# Patient Record
Sex: FEMALE | Race: WHITE | NOT HISPANIC OR LATINO
[De-identification: names, ages, dates, MRNs, and addresses within clinical notes are randomized per-mention and may not be internally consistent; named-entity substitution may affect disease eponyms.]

---

## 2017-05-22 ENCOUNTER — RX RENEWAL (OUTPATIENT)
Age: 27
End: 2017-05-22

## 2017-08-14 ENCOUNTER — RX RENEWAL (OUTPATIENT)
Age: 27
End: 2017-08-14

## 2017-08-23 ENCOUNTER — APPOINTMENT (OUTPATIENT)
Dept: OBGYN | Facility: CLINIC | Age: 27
End: 2017-08-23
Payer: COMMERCIAL

## 2017-08-23 VITALS
DIASTOLIC BLOOD PRESSURE: 60 MMHG | SYSTOLIC BLOOD PRESSURE: 100 MMHG | WEIGHT: 114 LBS | BODY MASS INDEX: 20.2 KG/M2 | HEIGHT: 63 IN

## 2017-08-23 PROCEDURE — 99395 PREV VISIT EST AGE 18-39: CPT

## 2017-08-28 LAB — CYTOLOGY CVX/VAG DOC THIN PREP: NORMAL

## 2018-03-20 ENCOUNTER — MEDICATION RENEWAL (OUTPATIENT)
Age: 28
End: 2018-03-20

## 2018-06-04 ENCOUNTER — APPOINTMENT (OUTPATIENT)
Dept: OBGYN | Facility: CLINIC | Age: 28
End: 2018-06-04
Payer: COMMERCIAL

## 2018-06-04 VITALS
BODY MASS INDEX: 20.55 KG/M2 | WEIGHT: 116 LBS | SYSTOLIC BLOOD PRESSURE: 120 MMHG | DIASTOLIC BLOOD PRESSURE: 80 MMHG | HEIGHT: 63 IN

## 2018-06-04 PROCEDURE — 99395 PREV VISIT EST AGE 18-39: CPT

## 2018-06-08 LAB — CYTOLOGY CVX/VAG DOC THIN PREP: NORMAL

## 2018-06-13 ENCOUNTER — EMERGENCY (EMERGENCY)
Facility: HOSPITAL | Age: 28
LOS: 1 days | Discharge: HOME | End: 2018-06-13
Attending: EMERGENCY MEDICINE
Payer: COMMERCIAL

## 2018-06-13 VITALS
SYSTOLIC BLOOD PRESSURE: 106 MMHG | OXYGEN SATURATION: 100 % | TEMPERATURE: 99 F | DIASTOLIC BLOOD PRESSURE: 68 MMHG | WEIGHT: 110.01 LBS | RESPIRATION RATE: 16 BRPM | HEART RATE: 60 BPM

## 2018-06-13 PROCEDURE — 99283 EMERGENCY DEPT VISIT LOW MDM: CPT

## 2018-06-13 PROCEDURE — 73630 X-RAY EXAM OF FOOT: CPT | Mod: 26,LT

## 2018-06-13 PROCEDURE — 73630 X-RAY EXAM OF FOOT: CPT

## 2018-06-13 PROCEDURE — 99283 EMERGENCY DEPT VISIT LOW MDM: CPT | Mod: 25

## 2018-06-13 RX ORDER — IBUPROFEN 200 MG
600 TABLET ORAL ONCE
Qty: 0 | Refills: 0 | Status: COMPLETED | OUTPATIENT
Start: 2018-06-13 | End: 2018-06-13

## 2018-06-13 RX ADMIN — Medication 600 MILLIGRAM(S): at 17:20

## 2018-06-13 RX ADMIN — Medication 600 MILLIGRAM(S): at 16:50

## 2018-06-13 NOTE — ED PROVIDER NOTE - NS ED ROS FT
GEN: no fever, no chills, no weakness  HENT: no eye pain, no visual changes, no ear pain, no visual or hearing changes, no sore throat, no swelling or neck pain  CV: no chest pain, no palpitations, no dizziness, no swelling  RESP: no coughing, no sob, no IWOB, no MOCTEZUMA  GI: no abd pain, no distension, no nausea, no vomiting, no diarrhea, no constipation  : no dysuria,  no frequency, no hematuria, no discharge, no flank pain  MUSCULOSKELETAL: no myalgia, no arthralgia, no joint swelling, no bruising   SKIN: no rash, no wounds, no itching  NEURO: no change in mentation, no visual changes, no HA, no focal weakness, no trouble speaking, no gait abnormalities, no dizziness  PSYCH: no suidical ideation, no homicidal ideation, no depression, no anxiety, no hallucinations

## 2018-06-13 NOTE — ED PROVIDER NOTE - CARE PLAN
Principal Discharge DX:	Closed nondisplaced fracture of fifth metatarsal bone of left foot, initial encounter

## 2018-06-13 NOTE — ED ADULT NURSE NOTE - OBJECTIVE STATEMENT
Pt presents to the fast track area s/p left foot injury, + tenderness, + sensation, + pedal pulses, + mobility of the toes, + capillary refill < 2 sec.

## 2018-06-13 NOTE — ED PROVIDER NOTE - MEDICAL DECISION MAKING DETAILS
27yo F no pmh p/w L ankle/foot injury. reports took step wrong and inverted on her foot. 29yo F no pmh p/w L ankle/foot injury. reports took step wrong and inverted on her foot.  XR shows spiral fx of L 5th metatarsal splinted and given crutches, pt to f/u w/ pcp/ortho for further eval and mgmt

## 2018-06-13 NOTE — ED PROVIDER NOTE - PHYSICAL EXAMINATION
GEN: awake, alert, well appearing, NAD   HENT: atraumatic, normocephalic, ALFREDA, EOMI, no midline instability, oropharynx w/o erythema or exudates, no lymphadenopathy  CV: normal rate and rhythm, S1, S2, no MRG, equal pulses throughout, no JVD  RESP: no distress, no IWOB, no retraction, clear to auscultation bilaterally   ABD: soft, nontender, nondistended, no rebound, no guarding, normoactive bowel sounds, no organomegally  MUSCULOSKELETAL: strenght 5/5 x 4, full range of motion, CMS intact, swelling and ttp to L lateral forefoot, cms intact    SKIN: normal color, no turgor, no wounds or rash   NEURO: Awake alert oriented x 3, no facial asymmetry, no slurred speech, no pronator drift, moving all extremities  PSYCH: no suicial ideation, no homicidal ideation, no depression, no anxiety, no hallucination

## 2019-06-12 ENCOUNTER — APPOINTMENT (OUTPATIENT)
Dept: OBGYN | Facility: CLINIC | Age: 29
End: 2019-06-12
Payer: COMMERCIAL

## 2019-06-12 VITALS
DIASTOLIC BLOOD PRESSURE: 62 MMHG | SYSTOLIC BLOOD PRESSURE: 94 MMHG | HEIGHT: 63 IN | WEIGHT: 115 LBS | BODY MASS INDEX: 20.38 KG/M2

## 2019-06-12 PROCEDURE — 99395 PREV VISIT EST AGE 18-39: CPT

## 2019-06-12 PROCEDURE — 36415 COLL VENOUS BLD VENIPUNCTURE: CPT

## 2019-06-13 PROBLEM — J45.909 UNSPECIFIED ASTHMA, UNCOMPLICATED: Chronic | Status: ACTIVE | Noted: 2018-06-13

## 2019-06-17 LAB — CYTOLOGY CVX/VAG DOC THIN PREP: NORMAL

## 2019-06-24 ENCOUNTER — RX RENEWAL (OUTPATIENT)
Age: 29
End: 2019-06-24

## 2019-07-15 ENCOUNTER — APPOINTMENT (OUTPATIENT)
Dept: OBGYN | Facility: CLINIC | Age: 29
End: 2019-07-15

## 2020-06-22 ENCOUNTER — APPOINTMENT (OUTPATIENT)
Dept: OBGYN | Facility: CLINIC | Age: 30
End: 2020-06-22
Payer: COMMERCIAL

## 2020-06-22 VITALS
HEIGHT: 63 IN | BODY MASS INDEX: 20.73 KG/M2 | WEIGHT: 117 LBS | SYSTOLIC BLOOD PRESSURE: 104 MMHG | DIASTOLIC BLOOD PRESSURE: 60 MMHG

## 2020-06-22 PROCEDURE — 99395 PREV VISIT EST AGE 18-39: CPT

## 2020-06-23 RX ORDER — LACTASE 3000 UNIT
TABLET ORAL
Refills: 0 | Status: DISCONTINUED | COMMUNITY
End: 2020-06-23

## 2020-06-23 RX ORDER — NORETHINDRONE ACETATE AND ETHINYL ESTRADIOL 1MG-20(21)
1-20 KIT ORAL
Qty: 84 | Refills: 3 | Status: DISCONTINUED | COMMUNITY
Start: 2017-05-22 | End: 2020-06-23

## 2020-06-23 RX ORDER — RANITIDINE HYDROCHLORIDE 300 MG/1
TABLET, FILM COATED ORAL
Refills: 0 | Status: DISCONTINUED | COMMUNITY
End: 2020-06-23

## 2020-06-29 LAB — CYTOLOGY CVX/VAG DOC THIN PREP: ABNORMAL

## 2020-12-18 ENCOUNTER — TRANSCRIPTION ENCOUNTER (OUTPATIENT)
Age: 30
End: 2020-12-18

## 2021-01-19 NOTE — ED ADULT NURSE NOTE - CHPI ED SYMPTOMS POS
TENDERNESS/PAIN Burow's Graft Text: The defect edges were debeveled with a #15 scalpel blade.  Given the location of the defect, shape of the defect, the proximity to free margins and the presence of a standing cone deformity a Burow's skin graft was deemed most appropriate. The standing cone was removed and this tissue was then trimmed to the shape of the primary defect. The adipose tissue was also removed until only dermis and epidermis were left.  The skin margins of the secondary defect were undermined to an appropriate distance in all directions utilizing iris scissors.  The secondary defect was closed with interrupted buried subcutaneous sutures.  The skin edges were then re-apposed with running  sutures.  The skin graft was then placed in the primary defect and oriented appropriately.

## 2021-03-19 ENCOUNTER — RX RENEWAL (OUTPATIENT)
Age: 31
End: 2021-03-19

## 2021-05-10 ENCOUNTER — APPOINTMENT (OUTPATIENT)
Dept: OBGYN | Facility: CLINIC | Age: 31
End: 2021-05-10
Payer: COMMERCIAL

## 2021-05-10 DIAGNOSIS — N90.7 VULVAR CYST: ICD-10-CM

## 2021-05-10 PROCEDURE — 99072 ADDL SUPL MATRL&STAF TM PHE: CPT

## 2021-05-10 PROCEDURE — 99213 OFFICE O/P EST LOW 20 MIN: CPT

## 2021-05-27 ENCOUNTER — RX RENEWAL (OUTPATIENT)
Age: 31
End: 2021-05-27

## 2021-06-28 ENCOUNTER — APPOINTMENT (OUTPATIENT)
Dept: OBGYN | Facility: CLINIC | Age: 31
End: 2021-06-28
Payer: COMMERCIAL

## 2021-06-28 VITALS
DIASTOLIC BLOOD PRESSURE: 72 MMHG | SYSTOLIC BLOOD PRESSURE: 120 MMHG | BODY MASS INDEX: 20.55 KG/M2 | WEIGHT: 116 LBS | HEIGHT: 63 IN

## 2021-06-28 DIAGNOSIS — Z01.419 ENCOUNTER FOR GYNECOLOGICAL EXAMINATION (GENERAL) (ROUTINE) W/OUT ABNORMAL FINDINGS: ICD-10-CM

## 2021-06-28 PROCEDURE — 99395 PREV VISIT EST AGE 18-39: CPT

## 2021-06-28 PROCEDURE — 99072 ADDL SUPL MATRL&STAF TM PHE: CPT

## 2021-06-28 RX ORDER — ACETAMINOPHEN 325 MG
TABLET ORAL
Refills: 0 | Status: DISCONTINUED | COMMUNITY
End: 2021-06-28

## 2021-06-28 RX ORDER — PNV NO.95/FERROUS FUM/FOLIC AC 28MG-0.8MG
TABLET ORAL
Refills: 0 | Status: DISCONTINUED | COMMUNITY
End: 2021-06-28

## 2021-07-01 LAB — CYTOLOGY CVX/VAG DOC THIN PREP: NORMAL

## 2021-07-05 PROBLEM — Z01.419 ENCOUNTER FOR ROUTINE GYNECOLOGICAL EXAMINATION: Status: RESOLVED | Noted: 2021-06-28 | Resolved: 2021-07-12

## 2021-12-23 ENCOUNTER — TRANSCRIPTION ENCOUNTER (OUTPATIENT)
Age: 31
End: 2021-12-23

## 2022-05-11 ENCOUNTER — NON-APPOINTMENT (OUTPATIENT)
Age: 32
End: 2022-05-11

## 2022-05-16 ENCOUNTER — APPOINTMENT (OUTPATIENT)
Dept: OBGYN | Facility: CLINIC | Age: 32
End: 2022-05-16
Payer: COMMERCIAL

## 2022-05-16 VITALS — BODY MASS INDEX: 20.19 KG/M2 | DIASTOLIC BLOOD PRESSURE: 70 MMHG | SYSTOLIC BLOOD PRESSURE: 122 MMHG | WEIGHT: 114 LBS

## 2022-05-16 PROCEDURE — 99213 OFFICE O/P EST LOW 20 MIN: CPT

## 2022-05-16 PROCEDURE — 36415 COLL VENOUS BLD VENIPUNCTURE: CPT

## 2022-05-16 RX ORDER — NORETHINDRONE ACETATE AND ETHINYL ESTRADIOL AND FERROUS FUMARATE 1MG-20(21)
1-20 KIT ORAL
Qty: 84 | Refills: 3 | Status: DISCONTINUED | COMMUNITY
Start: 2019-06-24 | End: 2022-05-16

## 2022-05-19 RX ORDER — MULTIVITAMIN
TABLET ORAL
Refills: 0 | Status: ACTIVE | COMMUNITY

## 2022-05-20 LAB
DHEA-S SERPL-MCNC: 103 UG/DL
ESTRADIOL SERPL-MCNC: 25 PG/ML
FSH SERPL-MCNC: 4.7 IU/L
LH SERPL-ACNC: 4.3 IU/L
PROLACTIN SERPL-MCNC: 17.3 NG/ML
TESTOST SERPL-MCNC: <2.5 NG/DL
TSH SERPL-ACNC: 3.16 UIU/ML

## 2022-06-29 ENCOUNTER — APPOINTMENT (OUTPATIENT)
Dept: OBGYN | Facility: CLINIC | Age: 32
End: 2022-06-29

## 2022-06-29 VITALS
BODY MASS INDEX: 20.91 KG/M2 | WEIGHT: 118 LBS | HEIGHT: 63 IN | DIASTOLIC BLOOD PRESSURE: 58 MMHG | SYSTOLIC BLOOD PRESSURE: 98 MMHG

## 2022-06-29 DIAGNOSIS — Z01.411 ENCOUNTER FOR GYNECOLOGICAL EXAMINATION (GENERAL) (ROUTINE) WITH ABNORMAL FINDINGS: ICD-10-CM

## 2022-06-29 DIAGNOSIS — N92.6 IRREGULAR MENSTRUATION, UNSPECIFIED: ICD-10-CM

## 2022-06-29 LAB — HCG UR QL: NEGATIVE

## 2022-06-29 PROCEDURE — 99395 PREV VISIT EST AGE 18-39: CPT

## 2022-06-29 PROCEDURE — 81025 URINE PREGNANCY TEST: CPT

## 2022-06-29 RX ORDER — MULTIVIT-MIN/VIT C/HERB NO.124 250-11.66
TABLET,CHEWABLE ORAL
Refills: 0 | Status: DISCONTINUED | COMMUNITY
End: 2022-06-29

## 2022-07-09 LAB — CYTOLOGY CVX/VAG DOC THIN PREP: NORMAL

## 2022-10-25 ENCOUNTER — APPOINTMENT (OUTPATIENT)
Dept: OBGYN | Facility: CLINIC | Age: 32
End: 2022-10-25

## 2022-10-25 VITALS
BODY MASS INDEX: 22.15 KG/M2 | SYSTOLIC BLOOD PRESSURE: 116 MMHG | HEIGHT: 63 IN | WEIGHT: 125 LBS | DIASTOLIC BLOOD PRESSURE: 70 MMHG

## 2022-10-25 PROCEDURE — 99213 OFFICE O/P EST LOW 20 MIN: CPT | Mod: 25

## 2022-10-25 PROCEDURE — 76830 TRANSVAGINAL US NON-OB: CPT

## 2022-10-31 LAB
C TRACH RRNA SPEC QL NAA+PROBE: NOT DETECTED
N GONORRHOEA RRNA SPEC QL NAA+PROBE: NOT DETECTED
SOURCE AMPLIFICATION: NORMAL

## 2022-11-03 ENCOUNTER — TRANSCRIPTION ENCOUNTER (OUTPATIENT)
Age: 32
End: 2022-11-03

## 2022-11-20 LAB
ABO + RH PNL BLD: NORMAL
AR GENE MUT ANL BLD/T: NORMAL
B19V IGG SER QL IA: 8.07 INDEX
B19V IGG+IGM SER-IMP: NORMAL
B19V IGG+IGM SER-IMP: POSITIVE
B19V IGM FLD-ACNC: 0.19 INDEX
B19V IGM SER-ACNC: NEGATIVE
BASOPHILS # BLD AUTO: 0.08 K/UL
BASOPHILS NFR BLD AUTO: 0.7 %
BLD GP AB SCN SERPL QL: NORMAL
CFTR MUT TESTED BLD/T: NEGATIVE
EOSINOPHIL # BLD AUTO: 0.1 K/UL
EOSINOPHIL NFR BLD AUTO: 0.9 %
FMR1 GENE MUT ANL BLD/T: NORMAL
HBV SURFACE AG SER QL: NONREACTIVE
HCT VFR BLD CALC: 39.8 %
HCV AB SER QL: NONREACTIVE
HCV S/CO RATIO: 0.07 S/CO
HGB A MFR BLD: 96.9 %
HGB A2 MFR BLD: 2.6 %
HGB BLD-MCNC: 13.3 G/DL
HGB F MFR BLD: 0.5 %
HGB FRACT BLD-IMP: NORMAL
HIV1+2 AB SPEC QL IA.RAPID: NONREACTIVE
IMM GRANULOCYTES NFR BLD AUTO: 0.4 %
LYMPHOCYTES # BLD AUTO: 3.66 K/UL
LYMPHOCYTES NFR BLD AUTO: 31.6 %
MAN DIFF?: NORMAL
MCHC RBC-ENTMCNC: 30.3 PG
MCHC RBC-ENTMCNC: 33.4 GM/DL
MCV RBC AUTO: 90.7 FL
MEV IGG FLD QL IA: 207 AU/ML
MEV IGG+IGM SER-IMP: POSITIVE
MONOCYTES # BLD AUTO: 0.96 K/UL
MONOCYTES NFR BLD AUTO: 8.3 %
NEUTROPHILS # BLD AUTO: 6.74 K/UL
NEUTROPHILS NFR BLD AUTO: 58.1 %
PLATELET # BLD AUTO: 272 K/UL
RBC # BLD: 4.39 M/UL
RBC # FLD: 12.6 %
RUBV IGG FLD-ACNC: 22.5 INDEX
RUBV IGG SER-IMP: POSITIVE
T PALLIDUM AB SER QL IA: NEGATIVE
TSH SERPL-ACNC: 2.06 UIU/ML
VZV AB TITR SER: NEGATIVE
VZV IGG SER IF-ACNC: 45.2 INDEX
WBC # FLD AUTO: 11.59 K/UL

## 2022-11-21 ENCOUNTER — APPOINTMENT (OUTPATIENT)
Dept: OBGYN | Facility: CLINIC | Age: 32
End: 2022-11-21

## 2022-11-21 VITALS
HEIGHT: 63 IN | SYSTOLIC BLOOD PRESSURE: 112 MMHG | WEIGHT: 126 LBS | BODY MASS INDEX: 22.32 KG/M2 | DIASTOLIC BLOOD PRESSURE: 64 MMHG

## 2022-11-21 PROCEDURE — 36415 COLL VENOUS BLD VENIPUNCTURE: CPT

## 2022-11-21 PROCEDURE — 0502F SUBSEQUENT PRENATAL CARE: CPT

## 2022-11-27 LAB
BILIRUB UR QL STRIP: NORMAL
GLUCOSE UR-MCNC: NORMAL
HCG UR QL: 0.2 EU/DL
HGB UR QL STRIP.AUTO: NORMAL
KETONES UR-MCNC: NORMAL
LEUKOCYTE ESTERASE UR QL STRIP: NORMAL
NITRITE UR QL STRIP: NORMAL
PH UR STRIP: 7
PROT UR STRIP-MCNC: NORMAL
SP GR UR STRIP: 1.01

## 2022-11-28 ENCOUNTER — APPOINTMENT (OUTPATIENT)
Dept: ANTEPARTUM | Facility: CLINIC | Age: 32
End: 2022-11-28

## 2022-11-28 ENCOUNTER — NON-APPOINTMENT (OUTPATIENT)
Age: 32
End: 2022-11-28

## 2022-11-28 ENCOUNTER — ASOB RESULT (OUTPATIENT)
Age: 32
End: 2022-11-28

## 2022-11-28 PROCEDURE — 36415 COLL VENOUS BLD VENIPUNCTURE: CPT

## 2022-11-28 PROCEDURE — 76813 OB US NUCHAL MEAS 1 GEST: CPT

## 2022-12-01 LAB
ADDITIONAL US: NORMAL
CRL SCAN TWIN B: NORMAL
CRL SCAN: NORMAL
CROWN RUMP LENGTH TWIN B: NORMAL
CROWN RUMP LENGTH: 55 MM
DIA MOM: 0.59
DIA VALUE: 167.9 PG/ML
DOWN SYNDROME AGE RISK: NORMAL
DOWN SYNDROME INTERPRETATION: NORMAL
DOWN SYNDROME SCREENING RISK: NORMAL
FIRST TRIMESTER SCREEN COMMENTS: NORMAL
FIRST TRIMESTER SCREEN NOTE: NORMAL
FIRST TRIMESTER SCREEN RESULTS: NORMAL
FIRST TRIMESTER SCREEN TEST RESULTS: NORMAL
GEST. AGE ON COLLECTION DATE: 11.9 WEEKS
HCG MOM: 0.63
HCG VALUE: 75.5 IU/ML
MATERNAL AGE AT EDD: 31.4 YR
NT MOM TWIN B: NORMAL
NT TWIN B: NORMAL
NUCHAL TRANSLUCENCY (NT): 1.4 MM
NUCHAL TRANSLUCENCY MOM: 1.08
NUMBER OF FETUSES: 1
PAPP-A MOM: 0.91
PAPP-A VALUE: 846.7 NG/ML
RACE: NORMAL
SONOGRAPHER ID#: NORMAL
TRISOMY 18 AGE RISK: NORMAL
TRISOMY 18 INTERPRETATION: NORMAL
TRISOMY 18 SCREENING RISK: NORMAL
WEIGHT AFP: 125 LBS

## 2022-12-19 ENCOUNTER — APPOINTMENT (OUTPATIENT)
Dept: OBGYN | Facility: CLINIC | Age: 32
End: 2022-12-19

## 2022-12-19 VITALS
DIASTOLIC BLOOD PRESSURE: 60 MMHG | HEIGHT: 63 IN | SYSTOLIC BLOOD PRESSURE: 110 MMHG | BODY MASS INDEX: 23.21 KG/M2 | WEIGHT: 131 LBS

## 2022-12-19 PROCEDURE — 36415 COLL VENOUS BLD VENIPUNCTURE: CPT

## 2022-12-19 PROCEDURE — 0502F SUBSEQUENT PRENATAL CARE: CPT

## 2022-12-26 LAB
AFP MOM: 0.68
AFP VALUE: 23.5 NG/ML
ALPHA FETOPROTEIN SERUM COMMENT: NORMAL
ALPHA FETOPROTEIN SERUM INTERPRETATION: NORMAL
ALPHA FETOPROTEIN SERUM RESULTS: NORMAL
ALPHA FETOPROTEIN SERUM TEST RESULTS: NORMAL
BILIRUB UR QL STRIP: NORMAL
GESTATIONAL AGE BASED ON: NORMAL
GESTATIONAL AGE ON COLLECTION DATE: 15.1 WEEKS
GLUCOSE UR-MCNC: NORMAL
HCG UR QL: 0.2 EU/DL
HGB UR QL STRIP.AUTO: NORMAL
INSULIN DEP DIABETES: NO
KETONES UR-MCNC: NORMAL
LEUKOCYTE ESTERASE UR QL STRIP: NORMAL
MATERNAL AGE AT EDD AFP: 31.3 YR
MULTIPLE GESTATION: NO
NITRITE UR QL STRIP: NORMAL
OSBR RISK 1 IN: NORMAL
PH UR STRIP: 7
PROT UR STRIP-MCNC: NORMAL
RACE: NORMAL
SP GR UR STRIP: 1.02
WEIGHT AFP: 128 LBS

## 2022-12-27 ENCOUNTER — APPOINTMENT (OUTPATIENT)
Dept: ANTEPARTUM | Facility: CLINIC | Age: 32
End: 2022-12-27

## 2023-01-18 ENCOUNTER — APPOINTMENT (OUTPATIENT)
Dept: OBGYN | Facility: CLINIC | Age: 33
End: 2023-01-18
Payer: COMMERCIAL

## 2023-01-18 VITALS
SYSTOLIC BLOOD PRESSURE: 120 MMHG | DIASTOLIC BLOOD PRESSURE: 70 MMHG | WEIGHT: 137 LBS | BODY MASS INDEX: 24.27 KG/M2 | HEIGHT: 63 IN

## 2023-01-18 LAB
BILIRUB UR QL STRIP: NORMAL
GLUCOSE UR-MCNC: NORMAL
HCG UR QL: 0.2 EU/DL
HGB UR QL STRIP.AUTO: NORMAL
KETONES UR-MCNC: NORMAL
LEUKOCYTE ESTERASE UR QL STRIP: NORMAL
NITRITE UR QL STRIP: NORMAL
PH UR STRIP: 8.5
PROT UR STRIP-MCNC: NORMAL
SP GR UR STRIP: 1.02

## 2023-01-18 PROCEDURE — 0502F SUBSEQUENT PRENATAL CARE: CPT

## 2023-01-23 ENCOUNTER — ASOB RESULT (OUTPATIENT)
Age: 33
End: 2023-01-23

## 2023-01-23 ENCOUNTER — APPOINTMENT (OUTPATIENT)
Dept: ANTEPARTUM | Facility: CLINIC | Age: 33
End: 2023-01-23
Payer: COMMERCIAL

## 2023-01-23 PROCEDURE — 76805 OB US >/= 14 WKS SNGL FETUS: CPT

## 2023-02-13 ENCOUNTER — APPOINTMENT (OUTPATIENT)
Dept: OBGYN | Facility: CLINIC | Age: 33
End: 2023-02-13
Payer: COMMERCIAL

## 2023-02-13 VITALS
SYSTOLIC BLOOD PRESSURE: 120 MMHG | HEIGHT: 63 IN | WEIGHT: 143 LBS | DIASTOLIC BLOOD PRESSURE: 60 MMHG | BODY MASS INDEX: 25.34 KG/M2

## 2023-02-13 PROCEDURE — 0502F SUBSEQUENT PRENATAL CARE: CPT

## 2023-02-17 LAB
BILIRUB UR QL STRIP: NORMAL
GLUCOSE UR-MCNC: NORMAL
HCG UR QL: 0.2 EU/DL
HGB UR QL STRIP.AUTO: NORMAL
KETONES UR-MCNC: NORMAL
LEUKOCYTE ESTERASE UR QL STRIP: NORMAL
NITRITE UR QL STRIP: NORMAL
PH UR STRIP: 7
PROT UR STRIP-MCNC: NORMAL
SP GR UR STRIP: 1.02

## 2023-03-13 ENCOUNTER — APPOINTMENT (OUTPATIENT)
Dept: OBGYN | Facility: CLINIC | Age: 33
End: 2023-03-13
Payer: COMMERCIAL

## 2023-03-13 VITALS
HEIGHT: 63 IN | DIASTOLIC BLOOD PRESSURE: 64 MMHG | BODY MASS INDEX: 25.87 KG/M2 | SYSTOLIC BLOOD PRESSURE: 110 MMHG | WEIGHT: 146 LBS

## 2023-03-13 DIAGNOSIS — R35.0 FREQUENCY OF MICTURITION: ICD-10-CM

## 2023-03-13 PROCEDURE — 0502F SUBSEQUENT PRENATAL CARE: CPT

## 2023-03-26 LAB
BACTERIA UR CULT: NORMAL
BASOPHILS # BLD AUTO: 0.06 K/UL
BASOPHILS NFR BLD AUTO: 0.5 %
BILIRUB UR QL STRIP: NORMAL
EOSINOPHIL # BLD AUTO: 0.07 K/UL
EOSINOPHIL NFR BLD AUTO: 0.5 %
GLUCOSE UR-MCNC: NORMAL
HCG UR QL: 0.2 EU/DL
HCT VFR BLD CALC: 43.5 %
HGB BLD-MCNC: 14.1 G/DL
HGB UR QL STRIP.AUTO: NORMAL
HIV1+2 AB SPEC QL IA.RAPID: NONREACTIVE
IMM GRANULOCYTES NFR BLD AUTO: 1.1 %
KETONES UR-MCNC: NORMAL
LEUKOCYTE ESTERASE UR QL STRIP: NORMAL
LYMPHOCYTES # BLD AUTO: 3.15 K/UL
LYMPHOCYTES NFR BLD AUTO: 23.7 %
MAN DIFF?: NORMAL
MCHC RBC-ENTMCNC: 31.1 PG
MCHC RBC-ENTMCNC: 32.4 GM/DL
MCV RBC AUTO: 95.8 FL
MONOCYTES # BLD AUTO: 0.92 K/UL
MONOCYTES NFR BLD AUTO: 6.9 %
NEUTROPHILS # BLD AUTO: 8.95 K/UL
NEUTROPHILS NFR BLD AUTO: 67.3 %
NITRITE UR QL STRIP: NORMAL
PH UR STRIP: 6.5
PLATELET # BLD AUTO: 267 K/UL
PROT UR STRIP-MCNC: NORMAL
RBC # BLD: 4.54 M/UL
RBC # FLD: 12.7 %
SP GR UR STRIP: 1.02
WBC # FLD AUTO: 13.3 K/UL

## 2023-04-03 ENCOUNTER — APPOINTMENT (OUTPATIENT)
Dept: OBGYN | Facility: CLINIC | Age: 33
End: 2023-04-03
Payer: COMMERCIAL

## 2023-04-03 VITALS
SYSTOLIC BLOOD PRESSURE: 102 MMHG | BODY MASS INDEX: 26.4 KG/M2 | WEIGHT: 149 LBS | DIASTOLIC BLOOD PRESSURE: 70 MMHG | HEIGHT: 63 IN

## 2023-04-03 PROCEDURE — 0502F SUBSEQUENT PRENATAL CARE: CPT

## 2023-04-17 ENCOUNTER — APPOINTMENT (OUTPATIENT)
Dept: OBGYN | Facility: CLINIC | Age: 33
End: 2023-04-17
Payer: COMMERCIAL

## 2023-04-17 VITALS
HEIGHT: 63 IN | SYSTOLIC BLOOD PRESSURE: 106 MMHG | BODY MASS INDEX: 27.11 KG/M2 | WEIGHT: 153 LBS | DIASTOLIC BLOOD PRESSURE: 60 MMHG

## 2023-04-17 PROCEDURE — 0502F SUBSEQUENT PRENATAL CARE: CPT

## 2023-04-18 ENCOUNTER — APPOINTMENT (OUTPATIENT)
Dept: ANTEPARTUM | Facility: CLINIC | Age: 33
End: 2023-04-18
Payer: COMMERCIAL

## 2023-04-18 ENCOUNTER — ASOB RESULT (OUTPATIENT)
Age: 33
End: 2023-04-18

## 2023-04-18 PROCEDURE — 76816 OB US FOLLOW-UP PER FETUS: CPT

## 2023-04-18 PROCEDURE — 76819 FETAL BIOPHYS PROFIL W/O NST: CPT | Mod: 59

## 2023-04-25 LAB
BILIRUB UR QL STRIP: NORMAL
BILIRUB UR QL STRIP: NORMAL
GLUCOSE UR-MCNC: NORMAL
GLUCOSE UR-MCNC: NORMAL
HCG UR QL: 0.2 EU/DL
HCG UR QL: 0.2 EU/DL
HGB UR QL STRIP.AUTO: NORMAL
HGB UR QL STRIP.AUTO: NORMAL
KETONES UR-MCNC: NORMAL
KETONES UR-MCNC: NORMAL
LEUKOCYTE ESTERASE UR QL STRIP: NORMAL
LEUKOCYTE ESTERASE UR QL STRIP: NORMAL
NITRITE UR QL STRIP: NORMAL
NITRITE UR QL STRIP: NORMAL
PH UR STRIP: 6
PH UR STRIP: 7
PROT UR STRIP-MCNC: NORMAL
PROT UR STRIP-MCNC: NORMAL
SP GR UR STRIP: 1.02
SP GR UR STRIP: 1.02

## 2023-05-01 ENCOUNTER — APPOINTMENT (OUTPATIENT)
Dept: OBGYN | Facility: CLINIC | Age: 33
End: 2023-05-01
Payer: COMMERCIAL

## 2023-05-01 VITALS
BODY MASS INDEX: 27.64 KG/M2 | SYSTOLIC BLOOD PRESSURE: 100 MMHG | HEIGHT: 63 IN | WEIGHT: 156 LBS | DIASTOLIC BLOOD PRESSURE: 62 MMHG

## 2023-05-01 PROCEDURE — 90715 TDAP VACCINE 7 YRS/> IM: CPT

## 2023-05-01 PROCEDURE — 90471 IMMUNIZATION ADMIN: CPT

## 2023-05-01 PROCEDURE — 0502F SUBSEQUENT PRENATAL CARE: CPT

## 2023-05-03 LAB
BILIRUB UR QL STRIP: NORMAL
GLUCOSE UR-MCNC: NORMAL
HCG UR QL: 0.2 EU/DL
HGB UR QL STRIP.AUTO: NORMAL
KETONES UR-MCNC: NORMAL
LEUKOCYTE ESTERASE UR QL STRIP: NORMAL
NITRITE UR QL STRIP: NORMAL
PH UR STRIP: 6.5
PROT UR STRIP-MCNC: NORMAL
SP GR UR STRIP: 1.01

## 2023-05-15 ENCOUNTER — APPOINTMENT (OUTPATIENT)
Dept: OBGYN | Facility: CLINIC | Age: 33
End: 2023-05-15
Payer: COMMERCIAL

## 2023-05-15 PROCEDURE — 0502F SUBSEQUENT PRENATAL CARE: CPT

## 2023-05-16 ENCOUNTER — APPOINTMENT (OUTPATIENT)
Dept: ANTEPARTUM | Facility: CLINIC | Age: 33
End: 2023-05-16
Payer: COMMERCIAL

## 2023-05-16 ENCOUNTER — ASOB RESULT (OUTPATIENT)
Age: 33
End: 2023-05-16

## 2023-05-16 PROCEDURE — 76816 OB US FOLLOW-UP PER FETUS: CPT

## 2023-05-17 LAB
BILIRUB UR QL STRIP: NORMAL
GLUCOSE UR-MCNC: NORMAL
GP B STREP DNA SPEC QL NAA+PROBE: NOT DETECTED
HCG UR QL: 0.2 EU/DL
HGB UR QL STRIP.AUTO: NORMAL
KETONES UR-MCNC: NORMAL
LEUKOCYTE ESTERASE UR QL STRIP: NORMAL
NITRITE UR QL STRIP: NORMAL
PH UR STRIP: 6
PROT UR STRIP-MCNC: NORMAL
SOURCE GBS: NORMAL
SP GR UR STRIP: 1.01

## 2023-05-22 ENCOUNTER — APPOINTMENT (OUTPATIENT)
Dept: OBGYN | Facility: CLINIC | Age: 33
End: 2023-05-22
Payer: COMMERCIAL

## 2023-05-22 VITALS
SYSTOLIC BLOOD PRESSURE: 102 MMHG | BODY MASS INDEX: 28.7 KG/M2 | HEIGHT: 63 IN | WEIGHT: 162 LBS | DIASTOLIC BLOOD PRESSURE: 60 MMHG

## 2023-05-22 PROCEDURE — 0502F SUBSEQUENT PRENATAL CARE: CPT

## 2023-05-30 ENCOUNTER — APPOINTMENT (OUTPATIENT)
Dept: OBGYN | Facility: CLINIC | Age: 33
End: 2023-05-30
Payer: COMMERCIAL

## 2023-05-30 VITALS
WEIGHT: 164 LBS | BODY MASS INDEX: 29.06 KG/M2 | SYSTOLIC BLOOD PRESSURE: 120 MMHG | HEIGHT: 63 IN | DIASTOLIC BLOOD PRESSURE: 72 MMHG

## 2023-05-30 PROCEDURE — 0502F SUBSEQUENT PRENATAL CARE: CPT

## 2023-05-31 ENCOUNTER — INPATIENT (INPATIENT)
Facility: HOSPITAL | Age: 33
LOS: 1 days | Discharge: HOME | End: 2023-06-02
Attending: OBSTETRICS & GYNECOLOGY | Admitting: OBSTETRICS & GYNECOLOGY
Payer: COMMERCIAL

## 2023-05-31 ENCOUNTER — TRANSCRIPTION ENCOUNTER (OUTPATIENT)
Age: 33
End: 2023-05-31

## 2023-05-31 VITALS
HEART RATE: 63 BPM | DIASTOLIC BLOOD PRESSURE: 72 MMHG | RESPIRATION RATE: 16 BRPM | TEMPERATURE: 99 F | SYSTOLIC BLOOD PRESSURE: 129 MMHG

## 2023-05-31 DIAGNOSIS — O26.899 OTHER SPECIFIED PREGNANCY RELATED CONDITIONS, UNSPECIFIED TRIMESTER: ICD-10-CM

## 2023-05-31 LAB
BASOPHILS # BLD AUTO: 0.05 K/UL — SIGNIFICANT CHANGE UP (ref 0–0.2)
BASOPHILS NFR BLD AUTO: 0.3 % — SIGNIFICANT CHANGE UP (ref 0–2)
BLD GP AB SCN SERPL QL: NEGATIVE — SIGNIFICANT CHANGE UP
EOSINOPHIL # BLD AUTO: 0.03 K/UL — SIGNIFICANT CHANGE UP (ref 0–0.5)
EOSINOPHIL NFR BLD AUTO: 0.2 % — SIGNIFICANT CHANGE UP (ref 0–6)
HCT VFR BLD CALC: 41.3 % — SIGNIFICANT CHANGE UP (ref 34.5–45)
HGB BLD-MCNC: 14.4 G/DL — SIGNIFICANT CHANGE UP (ref 11.5–15.5)
IANC: 14.2 K/UL — HIGH (ref 1.8–7.4)
IMM GRANULOCYTES NFR BLD AUTO: 1.2 % — HIGH (ref 0–0.9)
LYMPHOCYTES # BLD AUTO: 16.3 % — SIGNIFICANT CHANGE UP (ref 13–44)
LYMPHOCYTES # BLD AUTO: 3.05 K/UL — SIGNIFICANT CHANGE UP (ref 1–3.3)
MCHC RBC-ENTMCNC: 30.8 PG — SIGNIFICANT CHANGE UP (ref 27–34)
MCHC RBC-ENTMCNC: 34.9 GM/DL — SIGNIFICANT CHANGE UP (ref 32–36)
MCV RBC AUTO: 88.2 FL — SIGNIFICANT CHANGE UP (ref 80–100)
MONOCYTES # BLD AUTO: 1.17 K/UL — HIGH (ref 0–0.9)
MONOCYTES NFR BLD AUTO: 6.3 % — SIGNIFICANT CHANGE UP (ref 2–14)
NEUTROPHILS # BLD AUTO: 14.2 K/UL — HIGH (ref 1.8–7.4)
NEUTROPHILS NFR BLD AUTO: 75.7 % — SIGNIFICANT CHANGE UP (ref 43–77)
NRBC # BLD: 0 /100 WBCS — SIGNIFICANT CHANGE UP (ref 0–0)
NRBC # FLD: 0 K/UL — SIGNIFICANT CHANGE UP (ref 0–0)
PLATELET # BLD AUTO: 267 K/UL — SIGNIFICANT CHANGE UP (ref 150–400)
RBC # BLD: 4.68 M/UL — SIGNIFICANT CHANGE UP (ref 3.8–5.2)
RBC # FLD: 12.5 % — SIGNIFICANT CHANGE UP (ref 10.3–14.5)
RH IG SCN BLD-IMP: POSITIVE — SIGNIFICANT CHANGE UP
RH IG SCN BLD-IMP: POSITIVE — SIGNIFICANT CHANGE UP
WBC # BLD: 18.72 K/UL — HIGH (ref 3.8–10.5)
WBC # FLD AUTO: 18.72 K/UL — HIGH (ref 3.8–10.5)

## 2023-05-31 RX ORDER — OXYTOCIN 10 UNIT/ML
333.33 VIAL (ML) INJECTION
Qty: 20 | Refills: 0 | Status: DISCONTINUED | OUTPATIENT
Start: 2023-05-31 | End: 2023-06-02

## 2023-05-31 RX ORDER — CHLORHEXIDINE GLUCONATE 213 G/1000ML
1 SOLUTION TOPICAL DAILY
Refills: 0 | Status: DISCONTINUED | OUTPATIENT
Start: 2023-05-31 | End: 2023-06-01

## 2023-05-31 RX ORDER — SODIUM CHLORIDE 9 MG/ML
1000 INJECTION, SOLUTION INTRAVENOUS ONCE
Refills: 0 | Status: DISCONTINUED | OUTPATIENT
Start: 2023-05-31 | End: 2023-06-01

## 2023-05-31 RX ORDER — SODIUM CHLORIDE 9 MG/ML
1000 INJECTION, SOLUTION INTRAVENOUS
Refills: 0 | Status: DISCONTINUED | OUTPATIENT
Start: 2023-05-31 | End: 2023-06-01

## 2023-05-31 NOTE — OB PROVIDER TRIAGE NOTE - NSOBPROVIDERNOTE_OBGYN_ALL_OB_FT
34 y/o , EDC , GA 38.3 weeks, admit for labor.  -Admit to Labor and Delivery, discussed with Dr. Worley  -Admission Consents obtained  -GBS Negative   -Approved for epidural by Dr. Worley  -Expectant management    LISSA Melendez, PAC

## 2023-05-31 NOTE — OB PROVIDER H&P - NSLOWPPHRISK_OBGYN_A_OB
No previous uterine incision/[place] Pregnancy/Less than or equal to 4 previous vaginal births/No known bleeding disorder/No history of postpartum hemorrhage/No other PPH risks indicated

## 2023-05-31 NOTE — OB PROVIDER TRIAGE NOTE - NSHPPHYSICALEXAM_GEN_ALL_CORE
Vital Signs Last 24 Hrs  T(C): 37.0 (31 May 2023 19:55), Max: 37.0 (31 May 2023 19:55)  T(F): 98.6 (31 May 2023 19:55), Max: 98.6 (31 May 2023 19:55)  HR: 56 (31 May 2023 19:56) (56 - 56)  BP: 109/59 (31 May 2023 19:56) (109/59 - 109/59)  BP(mean): --  RR: --  SpO2: --    Gen: A/O x3  Abd: Gravid uterus, toco in place   TAS: vertex  FHR: 130 baseline, moderate variability, with accelerations, no decelerations, reactive  CTX: regular, q3-5 min   SVE: 5.5/90/-3, bulging bag  EFW: 2920

## 2023-05-31 NOTE — OB PROVIDER TRIAGE NOTE - HISTORY OF PRESENT ILLNESS
32 y/o , EDC , GA 38.3 weeks, presents for contractions since 2:30 AM today, now every 3-4 minutes, 8/10 in intensity on numeric pain scale. Pt requests epidural for pain relief. Denies VB/ LOF. Reports good FM.    Antepartum Course: Uncomplicated per pt  GBS: Negative 23  Covid Status: Vaccinated, denies ever having Covid  OBGYN History:  -denies history of fibroids, ovarian cysts, abnormal paps, STD's, herpes  Medical History: Denies  Surgical History: Denies  Allergies: Latex- rash, NKDA  Medications: PNV, lactaid pills when eats dairy  Mental Health History: Denies  Alcohol/Drug/Tobacco Use: Denies

## 2023-05-31 NOTE — CHART NOTE - NSCHARTNOTEFT_GEN_A_CORE
In room to check patient.  VE: 7/100/-1, AROMed for clear fluid  FHT category 1  [place]: q5mins  Will continue to expectantly manage.    AMANDA Worley MD

## 2023-05-31 NOTE — OB RN TRIAGE NOTE - FALL HARM RISK - UNIVERSAL INTERVENTIONS
Bed in lowest position, wheels locked, appropriate side rails in place/Call bell, personal items and telephone in reach/Instruct patient to call for assistance before getting out of bed or chair/Non-slip footwear when patient is out of bed/[place] to call system/Physically safe environment - no spills, clutter or unnecessary equipment/Purposeful Proactive Rounding/Room/bathroom lighting operational, light cord in reach

## 2023-05-31 NOTE — OB RN PATIENT PROFILE - NS_GESTAGE_OBGYN_ALL_OB_FT
-- DO NOT REPLY / DO NOT REPLY ALL --  -- Message is from the [facility]--    Provider paged via [employer] Documentation - The below message was copied and pasted from a [employer] page:    9/23/2021 3:48:28 PM  Initiated: 9/23/2021 3:44:36 PM   Message Sent: 9/23/2021 3:48:28 PM  [facility]  ACC  Dr. Xi Pichardo  Secure Text  [phone] ACC URGENT CALLER NAME: MISA RN ACC REQUESTED PHYSICIAN: DR. XI PICHARDO RE: PAUL PERES PATIENT 1947 PATIENT PCP: CHUY PICHARDO PATIENT WAS SEEN BY YOU LAST WEEK POSITIVE FOR STREP, FINISHED Z PACK ON MONDAY BUT NOW SORE THROAT HAS RETURNED ASKING FOR A REFILL OF Z PACK. PLEASE CALL THANKS PRS     [id number]

## 2023-05-31 NOTE — OB PROVIDER H&P - PROBLEM SELECTOR PLAN 1
32 y/o , EDC , GA 38.3 weeks, admit for labor.  -Admit to Labor and Delivery, discussed with Dr. Worley  -Admission Consents obtained  -GBS Negative   -Approved for epidural by Dr. Worley  -Expectant management    LISSA Melendez, PAC

## 2023-05-31 NOTE — OB PROVIDER H&P - NS_FHRLOC_OBGYN_ALL_OB
Pt states that [facility] in [facility] does not have the adderrall in stock. Pt asking for the rx to be sent to [facility] in [facility]. Please advise.
RLQ

## 2023-06-01 LAB
BILIRUB UR QL STRIP: NORMAL
COVID-19 SPIKE DOMAIN AB INTERP: POSITIVE
COVID-19 SPIKE DOMAIN ANTIBODY RESULT: >250 U/ML — HIGH
GLUCOSE UR-MCNC: NORMAL
HCG UR QL: 0.2 EU/DL
HGB UR QL STRIP.AUTO: NORMAL
KETONES UR-MCNC: NORMAL
LEUKOCYTE ESTERASE UR QL STRIP: NORMAL
NITRITE UR QL STRIP: NORMAL
PH UR STRIP: 6.5
PROT UR STRIP-MCNC: NORMAL
SARS-COV-2 IGG+IGM SERPL QL IA: >250 U/ML — HIGH
SARS-COV-2 IGG+IGM SERPL QL IA: POSITIVE
SP GR UR STRIP: 1.01

## 2023-06-01 PROCEDURE — 59400 OBSTETRICAL CARE: CPT

## 2023-06-01 RX ORDER — BENZOCAINE 10 %
1 GEL (GRAM) MUCOUS MEMBRANE EVERY 6 HOURS
Refills: 0 | Status: DISCONTINUED | OUTPATIENT
Start: 2023-06-01 | End: 2023-06-02

## 2023-06-01 RX ORDER — SENNA PLUS 8.6 MG/1
1 TABLET ORAL EVERY 12 HOURS
Refills: 0 | Status: DISCONTINUED | OUTPATIENT
Start: 2023-06-01 | End: 2023-06-02

## 2023-06-01 RX ORDER — ACETAMINOPHEN 500 MG
975 TABLET ORAL
Refills: 0 | Status: DISCONTINUED | OUTPATIENT
Start: 2023-06-01 | End: 2023-06-02

## 2023-06-01 RX ORDER — PRAMOXINE HYDROCHLORIDE 150 MG/15G
1 AEROSOL, FOAM RECTAL EVERY 4 HOURS
Refills: 0 | Status: DISCONTINUED | OUTPATIENT
Start: 2023-06-01 | End: 2023-06-02

## 2023-06-01 RX ORDER — SODIUM CHLORIDE 9 MG/ML
3 INJECTION INTRAMUSCULAR; INTRAVENOUS; SUBCUTANEOUS EVERY 8 HOURS
Refills: 0 | Status: DISCONTINUED | OUTPATIENT
Start: 2023-06-01 | End: 2023-06-02

## 2023-06-01 RX ORDER — OXYCODONE HYDROCHLORIDE 5 MG/1
5 TABLET ORAL ONCE
Refills: 0 | Status: DISCONTINUED | OUTPATIENT
Start: 2023-06-01 | End: 2023-06-02

## 2023-06-01 RX ORDER — HYDROCORTISONE 1 %
1 OINTMENT (GRAM) TOPICAL EVERY 6 HOURS
Refills: 0 | Status: DISCONTINUED | OUTPATIENT
Start: 2023-06-01 | End: 2023-06-02

## 2023-06-01 RX ORDER — DIBUCAINE 1 %
1 OINTMENT (GRAM) RECTAL EVERY 6 HOURS
Refills: 0 | Status: DISCONTINUED | OUTPATIENT
Start: 2023-06-01 | End: 2023-06-02

## 2023-06-01 RX ORDER — OXYTOCIN 10 UNIT/ML
41.67 VIAL (ML) INJECTION
Qty: 20 | Refills: 0 | Status: DISCONTINUED | OUTPATIENT
Start: 2023-06-01 | End: 2023-06-02

## 2023-06-01 RX ORDER — KETOROLAC TROMETHAMINE 30 MG/ML
30 SYRINGE (ML) INJECTION ONCE
Refills: 0 | Status: DISCONTINUED | OUTPATIENT
Start: 2023-06-01 | End: 2023-06-01

## 2023-06-01 RX ORDER — AER TRAVELER 0.5 G/1
1 SOLUTION RECTAL; TOPICAL EVERY 4 HOURS
Refills: 0 | Status: DISCONTINUED | OUTPATIENT
Start: 2023-06-01 | End: 2023-06-02

## 2023-06-01 RX ORDER — DIPHENHYDRAMINE HCL 50 MG
25 CAPSULE ORAL EVERY 6 HOURS
Refills: 0 | Status: DISCONTINUED | OUTPATIENT
Start: 2023-06-01 | End: 2023-06-02

## 2023-06-01 RX ORDER — IBUPROFEN 200 MG
600 TABLET ORAL EVERY 6 HOURS
Refills: 0 | Status: COMPLETED | OUTPATIENT
Start: 2023-06-01 | End: 2024-04-29

## 2023-06-01 RX ORDER — ACETAMINOPHEN 500 MG
3 TABLET ORAL
Qty: 0 | Refills: 0 | DISCHARGE
Start: 2023-06-01

## 2023-06-01 RX ORDER — OXYCODONE HYDROCHLORIDE 5 MG/1
5 TABLET ORAL
Refills: 0 | Status: DISCONTINUED | OUTPATIENT
Start: 2023-06-01 | End: 2023-06-02

## 2023-06-01 RX ORDER — MAGNESIUM HYDROXIDE 400 MG/1
30 TABLET, CHEWABLE ORAL
Refills: 0 | Status: DISCONTINUED | OUTPATIENT
Start: 2023-06-01 | End: 2023-06-02

## 2023-06-01 RX ORDER — SIMETHICONE 80 MG/1
80 TABLET, CHEWABLE ORAL EVERY 4 HOURS
Refills: 0 | Status: DISCONTINUED | OUTPATIENT
Start: 2023-06-01 | End: 2023-06-02

## 2023-06-01 RX ORDER — IBUPROFEN 200 MG
600 TABLET ORAL EVERY 6 HOURS
Refills: 0 | Status: DISCONTINUED | OUTPATIENT
Start: 2023-06-01 | End: 2023-06-02

## 2023-06-01 RX ORDER — IBUPROFEN 200 MG
1 TABLET ORAL
Qty: 0 | Refills: 0 | DISCHARGE
Start: 2023-06-01

## 2023-06-01 RX ORDER — LANOLIN
1 OINTMENT (GRAM) TOPICAL EVERY 6 HOURS
Refills: 0 | Status: DISCONTINUED | OUTPATIENT
Start: 2023-06-01 | End: 2023-06-02

## 2023-06-01 RX ORDER — TETANUS TOXOID, REDUCED DIPHTHERIA TOXOID AND ACELLULAR PERTUSSIS VACCINE, ADSORBED 5; 2.5; 8; 8; 2.5 [IU]/.5ML; [IU]/.5ML; UG/.5ML; UG/.5ML; UG/.5ML
0.5 SUSPENSION INTRAMUSCULAR ONCE
Refills: 0 | Status: DISCONTINUED | OUTPATIENT
Start: 2023-06-01 | End: 2023-06-02

## 2023-06-01 RX ADMIN — Medication 975 MILLIGRAM(S): at 22:00

## 2023-06-01 RX ADMIN — SODIUM CHLORIDE 3 MILLILITER(S): 9 INJECTION INTRAMUSCULAR; INTRAVENOUS; SUBCUTANEOUS at 13:00

## 2023-06-01 RX ADMIN — SENNA PLUS 1 TABLET(S): 8.6 TABLET ORAL at 17:54

## 2023-06-01 RX ADMIN — SODIUM CHLORIDE 3 MILLILITER(S): 9 INJECTION INTRAMUSCULAR; INTRAVENOUS; SUBCUTANEOUS at 06:00

## 2023-06-01 RX ADMIN — Medication 600 MILLIGRAM(S): at 12:26

## 2023-06-01 RX ADMIN — SODIUM CHLORIDE 3 MILLILITER(S): 9 INJECTION INTRAMUSCULAR; INTRAVENOUS; SUBCUTANEOUS at 22:00

## 2023-06-01 RX ADMIN — Medication 600 MILLIGRAM(S): at 17:54

## 2023-06-01 RX ADMIN — Medication 1 TABLET(S): at 12:26

## 2023-06-01 RX ADMIN — Medication 600 MILLIGRAM(S): at 23:42

## 2023-06-01 RX ADMIN — Medication 30 MILLIGRAM(S): at 03:56

## 2023-06-01 RX ADMIN — Medication 600 MILLIGRAM(S): at 12:56

## 2023-06-01 RX ADMIN — Medication 975 MILLIGRAM(S): at 21:19

## 2023-06-01 NOTE — LACTATION INITIAL EVALUATION - LACTATION INTERVENTIONS
assisted to put baby to rt. breast in football hold and left breast in cross cradle hold position with deep latch and baby noted to suck with stimulation/initiate/review safe skin-to-skin/initiate/review hand expression/initiate/review techniques for position and latch/initiate/review breast massage/compression/reviewed components of an effective feeding and at least 8 effective feedings per day required/reviewed importance of monitoring infant diapers, the breastfeeding log, and minimum output each day/reviewed risks of unnecessary formula supplementation/reviewed risks of artificial nipples/reviewed benefits and recommendations for rooming in/reviewed feeding on demand/by cue at least 8 times a day/reviewed indications of inadequate milk transfer that would require supplementation

## 2023-06-01 NOTE — LACTATION INITIAL EVALUATION - INTERVENTION OUTCOME
Encouraged to ask for assistance as needed./verbalizes understanding/demonstrates understanding of teaching/good return demonstration

## 2023-06-01 NOTE — DISCHARGE NOTE OB - NS MD DC FALL RISK RISK
For information on Fall & Injury Prevention, visit: [email or web address] OR  [email or web address] OR  https://www.cdc.gov/steadi/patient.html

## 2023-06-01 NOTE — DISCHARGE NOTE OB - PATIENT PORTAL LINK FT
You can access the FollowMyHealth Patient Portal offered by [facility] by registering at the following website: [email or web address]. By joining [employer]’s FollowMyHealth portal, you will also be able to view your health information using other applications (apps) compatible with our system.

## 2023-06-01 NOTE — DISCHARGE NOTE OB - NSCORESITESY/N_GEN_A_CORE_RD
Physical Therapy Daily Treatment    Visit Count: 18  Plan of Care: 11/6/2018 Through: 2/26/2019  Insurance Information:NO AUTH  NO COPAY  DED. FAM. $4000 (MET)  OOP IND. $6000 (MET $3757.38) FAM. $12,000 (MET $4712.69)  COVERAGE 80%  30 PT VISITS PER CALENDAR YEAR HARD LIMIT (0 USED)  IONTO 95741 IS VALID AND BILLABLE 4 MODALITIES PER DAY   Precautions: WBAT RLE, can wean from brace at 2-4 weeks     SUBJECTIVE   Pt reports no pain in right knee today. No change in symptoms. Discussed plan of care with pt and pt's mom- will transition to working with  at school to progress more return to sport activities. Pt's mom states that they have been contacted by [facility] regarding getting a custom ACL brace ordered. Current Pain (0-10 scale): 0  Functional Change: none stated     14 weeks post-op on 1/25/19. OBJECTIVE   Range of Motion (degrees)  Â  Left Right Right    Date Initial Initial 1/7/19   Knee Flexion (135) 150 91*  151   Knee Extension (0-5) -10 4 -10   Reported in degrees, active range of motion recorded unless noted as AA=active assistive or P=passive; standard testing positions unless otherwise noted, norms included in ( ); *=pain   Only those motions that were assessed are noted. Strength: (out of 5)  Â  Left Right   Date Initial 1/7/19   Hip Flexors 5 5Â    Hip Extensors Â  Â    Hip Abductors Â  Â    Hip Adductors Â  Â    Hip Internal Rotators 4+ Â 4+   Hip External Rotators 4+ Â 4+   Knee Flexors 5 Â 5   Knee Extensors 5 Â 5   Ankle Dorsiflexors 5 Â 5   Ankle Plantar Flexors Â  Â    Ankle Invertors Â  Â    Ankle Evertors Â  Â    standard testing positions unless otherwise noted; *=pain  Only muscle strength that was assessed are noted.        Treatment   Therapeutic Exercise:   Exercise Repetitions Sets Position/Cues   Elliptical, quick start  5 minutes         SL hops  -fwd/side R/L   5   2 Stick landing   12' plyobox  -DL drop downs  -DL drop downs- lateral jump L/R  -DL drop downs-lateral jumpL/R-fwd jump  -SL jump ups- DL landing   10  5 each  5 each    5 each   1  1  1    1                            Dynamic Gait:  Mini jog fwd/retro   Low skips fwd/retro   Cross over skips fwd   Shuffle R/L       Carioca R/L   Lunge walk into heel raise     40 feet   40 feet   40 feet   40 feet        40 feet   40 feet   2 each   2 each   4  2      2  2                Yellow med ball  Lunge- curtsy to lateral with heel raise  10 each  2 Bilateral   Yellow med ball OH   Shuttle  -Jumps  -SL hops/bounds   15  10 each   2  2 2 black      Bounding   -lateral   -fwd/back    10 each   10 each    1  2   Bilateral   Bilateral                    Comments: Patient consents to portions of this treatment instructed by EVY Padron, under the supervision of Johnie Rivera DPT. TESTING:    Y balance   Left   Right  -Fwd   60          58/59/57  -PM   99          98/96/99  -PL   93          94/96/96          Skilled input: see TE chart above     Home Program:   Exercise: Date issued Date DC Comments   Phase 1 and phase 2 ACL  11/6/18  Red theraloop for Abduction/Extension/Flexion   Tandem stance holds with EC, swing step 11/8/2018     Prone quad stretch with strap  11/15/18      Lateral Hip circuit 11/19/2018     Steamboats, Planks, Elliptical 12/4/2018  Green theraloop   Prone and side plank  12/4/18      SL calf raise, cone kick, walking lunge  12/11/18      Pre running progression 1/4/19     Lateral to curtsy lunge  1/17/19     Squat jumps  1/21/19                   Writer verbally educated the patient and received verbal consent from the patient on hand placement, positioning of patient, and techniques to be performed today including above interventions and how they are pertinent to the patient's plan of care.       Suggestions for next session as indicated: progress per plan of care, core strength and stability, progress SL stance tasks, progress shuttle jumps to on-land jumps/hops; progress pre-running progression     ASSESSMENT No Pt reports no pain at end of session. Pt demonstrates good understanding of home exercise program, continues to progress well with strengthening and dynamic exercises. Pt demonstrates good form with exercises and tested well in Y balance. Even though the scores were close she had a bit less right leg stability when compared to left. At this time pt is to continue with  at school and will hold formal PT services at this time, pending return to sport testing. Pt and pt's mom are agreeable to this plan. Pain after treatment (patient reported, 0-10 scale): 0  Result of above outlined education: Verbalizes understanding and Demonstrates understanding       THERAPY DAILY BILLING   Insurance: [facility] 2.  N/A    Evaluation Procedures:  No evaluation codes were used on this date of service    Timed Procedures:  Therapeutic Exercise, 45 minutes    Untimed Procedures:  No untimed codes were used on this date of service    Total Treatment Time: 45  minutes

## 2023-06-01 NOTE — DISCHARGE NOTE OB - MATERIALS PROVIDED
[facility] Delta Screening Program/Delta  Immunization Record/Breastfeeding Log/Breastfeeding Mother’s Support Group Information/Guide to Postpartum Care/[facility] Hearing Screen Program/Back To Sleep Handout/Shaken Baby Prevention Handout/Breastfeeding Guide and Packet/Birth Certificate Instructions/Discharge Medication Information for Patients and Families Pocket Guide

## 2023-06-01 NOTE — DISCHARGE NOTE OB - CARE PROVIDER_API CALL
Afia Guy  Obstetrics and Gynecology  [address], [address]  [place] [address]  Phone: [phone]  Fax: [phone]  Follow Up Time:

## 2023-06-01 NOTE — OB PROVIDER DELIVERY SUMMARY - NSPROVIDERDELIVERYNOTE_OBGYN_ALL_OB_FT
Pt found to be 10/100/+1  Delivery of liveborn male infant from OA position  Head, shoulders and body delivered easily, loose nuchal x1  Placenta delivered spontaneously and intact   1st degree laceration repaired with chromic suture, adequate hemostasis noted  Fundus firm

## 2023-06-01 NOTE — DISCHARGE NOTE OB - CARE PLAN
1 Principal Discharge DX:	Normal vaginal delivery  Assessment and plan of treatment:	Nothing per vagina  No tub soaking or heavy lifting

## 2023-06-01 NOTE — DISCHARGE NOTE OB - NSTOBACCONEVERSMOKERY/N_GEN_A
[de-identified] : Patient presents today with complaints of right knee pain and intermittent swelling. Patient's x-rays and total knee replacement appeared to be functioning without problem. I would recommend a reduced activities for the next 7-10 days. I am confident her symptoms will improve on their own. If the symptoms continue she'll see us back in the office within the next 2 weeks.
No

## 2023-06-01 NOTE — OB PROVIDER DELIVERY SUMMARY - NSSELHIDDEN_OBGYN_ALL_OB_FT
[NS_DeliveryAttending1_OBGYN_ALL_OB_FT:[id number]=],[NS_DeliveryRN_OBGYN_ALL_OB_FT:[id number]=]

## 2023-06-01 NOTE — DISCHARGE NOTE OB - MEDICATION SUMMARY - MEDICATIONS TO TAKE
I will START or STAY ON the medications listed below when I get home from the hospital:    ibuprofen 600 mg oral tablet  -- 1 tab(s) by mouth every 6 hours  -- Indication: For     acetaminophen 325 mg oral tablet  -- 3 tab(s) by mouth 4 times a day  -- Indication: For

## 2023-06-01 NOTE — OB RN DELIVERY SUMMARY - NS_SEPSISRSKCALC_OBGYN_ALL_OB_FT
EOS calculated successfully. EOS Risk Factor: 0.5/1000 live births ([facility] national incidence); GA=38w4d; Temp=98.6; ROM=2.517; GBS='Negative'; Antibiotics='No antibiotics or any antibiotics < 2 hrs prior to birth'

## 2023-06-02 VITALS
RESPIRATION RATE: 18 BRPM | OXYGEN SATURATION: 100 % | HEART RATE: 75 BPM | DIASTOLIC BLOOD PRESSURE: 69 MMHG | SYSTOLIC BLOOD PRESSURE: 128 MMHG | TEMPERATURE: 98 F

## 2023-06-02 RX ADMIN — Medication 600 MILLIGRAM(S): at 00:40

## 2023-06-02 RX ADMIN — Medication 600 MILLIGRAM(S): at 05:33

## 2023-06-02 RX ADMIN — SODIUM CHLORIDE 3 MILLILITER(S): 9 INJECTION INTRAMUSCULAR; INTRAVENOUS; SUBCUTANEOUS at 13:27

## 2023-06-02 RX ADMIN — Medication 975 MILLIGRAM(S): at 02:26

## 2023-06-02 RX ADMIN — SODIUM CHLORIDE 3 MILLILITER(S): 9 INJECTION INTRAMUSCULAR; INTRAVENOUS; SUBCUTANEOUS at 06:20

## 2023-06-02 RX ADMIN — MAGNESIUM HYDROXIDE 30 MILLILITER(S): 400 TABLET, CHEWABLE ORAL at 08:59

## 2023-06-02 RX ADMIN — Medication 1 TABLET(S): at 12:06

## 2023-06-02 RX ADMIN — Medication 975 MILLIGRAM(S): at 09:50

## 2023-06-02 RX ADMIN — Medication 975 MILLIGRAM(S): at 15:36

## 2023-06-02 RX ADMIN — SENNA PLUS 1 TABLET(S): 8.6 TABLET ORAL at 05:34

## 2023-06-02 RX ADMIN — Medication 600 MILLIGRAM(S): at 06:20

## 2023-06-02 RX ADMIN — Medication 975 MILLIGRAM(S): at 08:56

## 2023-06-02 RX ADMIN — Medication 600 MILLIGRAM(S): at 13:00

## 2023-06-02 RX ADMIN — Medication 975 MILLIGRAM(S): at 03:00

## 2023-06-02 RX ADMIN — Medication 600 MILLIGRAM(S): at 12:06

## 2023-06-02 NOTE — PROGRESS NOTE ADULT - SUBJECTIVE AND OBJECTIVE BOX
Pt without complaints - lochia decreased  Vital Signs Last 24 Hrs  T(C): 36.7 (02 Jun 2023 06:32), Max: 36.9 (01 Jun 2023 09:30)  T(F): 98.1 (02 Jun 2023 06:32), Max: 98.4 (01 Jun 2023 09:30)  HR: 76 (02 Jun 2023 06:32) (69 - 78)  BP: 121/76 (02 Jun 2023 06:32) (117/69 - 121/76)  BP(mean): --  RR: 18 (02 Jun 2023 06:32) (18 - 18)  SpO2: 99% (02 Jun 2023 06:32) (98% - 100%)    Parameters below as of 02 Jun 2023 06:32  Patient On (Oxygen Delivery Method): room air    Blood Type: B Positive    abdomen soft, non-tender, fundus firm  extremities non tender  s/p nsd  doing well  d/c home

## 2023-06-03 LAB — T PALLIDUM AB TITR SER: NEGATIVE — SIGNIFICANT CHANGE UP

## 2023-06-05 ENCOUNTER — APPOINTMENT (OUTPATIENT)
Dept: OBGYN | Facility: CLINIC | Age: 33
End: 2023-06-05

## 2023-06-12 ENCOUNTER — APPOINTMENT (OUTPATIENT)
Dept: OBGYN | Facility: CLINIC | Age: 33
End: 2023-06-12

## 2023-06-26 ENCOUNTER — NON-APPOINTMENT (OUTPATIENT)
Age: 33
End: 2023-06-26

## 2023-07-10 ENCOUNTER — APPOINTMENT (OUTPATIENT)
Dept: OBGYN | Facility: CLINIC | Age: 33
End: 2023-07-10
Payer: COMMERCIAL

## 2023-07-10 VITALS
DIASTOLIC BLOOD PRESSURE: 70 MMHG | WEIGHT: 141 LBS | SYSTOLIC BLOOD PRESSURE: 110 MMHG | BODY MASS INDEX: 24.98 KG/M2 | HEIGHT: 63 IN

## 2023-07-10 DIAGNOSIS — Z3A.36 36 WEEKS GESTATION OF PREGNANCY: ICD-10-CM

## 2023-07-10 DIAGNOSIS — Z3A.38 38 WEEKS GESTATION OF PREGNANCY: ICD-10-CM

## 2023-07-10 DIAGNOSIS — Z3A.30 30 WEEKS GESTATION OF PREGNANCY: ICD-10-CM

## 2023-07-10 DIAGNOSIS — Z3A.15 15 WEEKS GESTATION OF PREGNANCY: ICD-10-CM

## 2023-07-10 DIAGNOSIS — Z3A.27 27 WEEKS GESTATION OF PREGNANCY: ICD-10-CM

## 2023-07-10 DIAGNOSIS — Z3A.37 37 WEEKS GESTATION OF PREGNANCY: ICD-10-CM

## 2023-07-10 DIAGNOSIS — Z23 ENCOUNTER FOR IMMUNIZATION: ICD-10-CM

## 2023-07-10 DIAGNOSIS — Z11.3 ENCOUNTER FOR SCREENING FOR INFECTIONS WITH A PREDOMINANTLY SEXUAL MODE OF TRANSMISSION: ICD-10-CM

## 2023-07-10 DIAGNOSIS — Z87.42 PERSONAL HISTORY OF OTHER DISEASES OF THE FEMALE GENITAL TRACT: ICD-10-CM

## 2023-07-10 DIAGNOSIS — Z32.02 ENCOUNTER FOR PREGNANCY TEST, RESULT NEGATIVE: ICD-10-CM

## 2023-07-10 DIAGNOSIS — Z36.85 ENCOUNTER FOR ANTENATAL SCREENING FOR STREPTOCOCCUS B: ICD-10-CM

## 2023-07-10 DIAGNOSIS — Z3A.11 11 WEEKS GESTATION OF PREGNANCY: ICD-10-CM

## 2023-07-10 DIAGNOSIS — Z3A.32 32 WEEKS GESTATION OF PREGNANCY: ICD-10-CM

## 2023-07-10 DIAGNOSIS — Z3A.23 23 WEEKS GESTATION OF PREGNANCY: ICD-10-CM

## 2023-07-10 DIAGNOSIS — Z3A.34 34 WEEKS GESTATION OF PREGNANCY: ICD-10-CM

## 2023-07-10 DIAGNOSIS — Z3A.19 19 WEEKS GESTATION OF PREGNANCY: ICD-10-CM

## 2023-07-10 PROCEDURE — 0503F POSTPARTUM CARE VISIT: CPT

## 2023-07-31 ENCOUNTER — NON-APPOINTMENT (OUTPATIENT)
Age: 33
End: 2023-07-31

## 2023-10-30 ENCOUNTER — APPOINTMENT (OUTPATIENT)
Dept: OBGYN | Facility: CLINIC | Age: 33
End: 2023-10-30
Payer: COMMERCIAL

## 2023-10-30 VITALS
WEIGHT: 128 LBS | DIASTOLIC BLOOD PRESSURE: 60 MMHG | SYSTOLIC BLOOD PRESSURE: 102 MMHG | BODY MASS INDEX: 22.68 KG/M2 | HEIGHT: 63 IN

## 2023-10-30 DIAGNOSIS — Z01.419 ENCOUNTER FOR GYNECOLOGICAL EXAMINATION (GENERAL) (ROUTINE) W/OUT ABNORMAL FINDINGS: ICD-10-CM

## 2023-10-30 PROCEDURE — 99395 PREV VISIT EST AGE 18-39: CPT

## 2023-10-30 RX ORDER — TURMERIC ROOT EXTRACT 500 MG
TABLET ORAL
Refills: 0 | Status: DISCONTINUED | COMMUNITY
End: 2023-10-30

## 2023-10-30 RX ORDER — PAROXETINE HYDROCHLORIDE 10 MG/1
10 TABLET, FILM COATED ORAL DAILY
Qty: 30 | Refills: 0 | Status: DISCONTINUED | COMMUNITY
Start: 2023-07-31 | End: 2023-10-30

## 2023-10-30 RX ORDER — BIOTIN 10 MG
TABLET ORAL
Refills: 0 | Status: DISCONTINUED | COMMUNITY
End: 2023-10-30

## 2023-11-05 LAB — CYTOLOGY CVX/VAG DOC THIN PREP: NORMAL

## 2024-10-22 ENCOUNTER — NON-APPOINTMENT (OUTPATIENT)
Age: 34
End: 2024-10-22

## 2024-11-06 ENCOUNTER — APPOINTMENT (OUTPATIENT)
Dept: OBGYN | Facility: CLINIC | Age: 34
End: 2024-11-06

## 2024-11-06 VITALS — SYSTOLIC BLOOD PRESSURE: 106 MMHG | DIASTOLIC BLOOD PRESSURE: 70 MMHG

## 2024-11-06 DIAGNOSIS — Z11.3 ENCOUNTER FOR SCREENING FOR INFECTIONS WITH A PREDOMINANTLY SEXUAL MODE OF TRANSMISSION: ICD-10-CM

## 2024-11-06 DIAGNOSIS — R10.2 PELVIC AND PERINEAL PAIN: ICD-10-CM

## 2024-11-06 PROCEDURE — 76830 TRANSVAGINAL US NON-OB: CPT

## 2024-11-06 PROCEDURE — 99213 OFFICE O/P EST LOW 20 MIN: CPT | Mod: 25

## 2024-11-11 LAB
BACTERIA UR CULT: NORMAL
C TRACH RRNA SPEC QL NAA+PROBE: NOT DETECTED
CANDIDA VAG CYTO: NOT DETECTED
G VAGINALIS+PREV SP MTYP VAG QL MICRO: NOT DETECTED
N GONORRHOEA RRNA SPEC QL NAA+PROBE: NOT DETECTED
SOURCE AMPLIFICATION: NORMAL
T VAGINALIS VAG QL WET PREP: NOT DETECTED

## 2024-11-24 NOTE — OB RN DELIVERY SUMMARY - NSSELHIDDEN_OBGYN_ALL_OB_FT
no weight-bearing restrictions [NS_DeliveryAttending1_OBGYN_ALL_OB_FT:[id number]],[NS_DeliveryRN_OBGYN_ALL_OB_FT:[id number]=]

## 2024-12-16 ENCOUNTER — APPOINTMENT (OUTPATIENT)
Dept: OBGYN | Facility: CLINIC | Age: 34
End: 2024-12-16
Payer: COMMERCIAL

## 2024-12-16 VITALS
BODY MASS INDEX: 21.44 KG/M2 | WEIGHT: 121 LBS | HEIGHT: 63 IN | SYSTOLIC BLOOD PRESSURE: 118 MMHG | DIASTOLIC BLOOD PRESSURE: 78 MMHG

## 2024-12-16 DIAGNOSIS — Z80.42 FAMILY HISTORY OF MALIGNANT NEOPLASM OF PROSTATE: ICD-10-CM

## 2024-12-16 DIAGNOSIS — Z01.419 ENCOUNTER FOR GYNECOLOGICAL EXAMINATION (GENERAL) (ROUTINE) W/OUT ABNORMAL FINDINGS: ICD-10-CM

## 2024-12-16 PROCEDURE — 99395 PREV VISIT EST AGE 18-39: CPT

## 2024-12-22 PROBLEM — Z80.42 FAMILY HISTORY OF MALIGNANT NEOPLASM OF PROSTATE: Status: ACTIVE | Noted: 2024-12-16

## 2024-12-22 LAB — CYTOLOGY CVX/VAG DOC THIN PREP: NORMAL

## 2024-12-22 RX ORDER — ESCITALOPRAM OXALATE 5 MG/1
TABLET, FILM COATED ORAL
Refills: 0 | Status: ACTIVE | COMMUNITY

## 2025-09-08 ENCOUNTER — APPOINTMENT (OUTPATIENT)
Dept: OBGYN | Facility: CLINIC | Age: 35
End: 2025-09-08
Payer: COMMERCIAL

## 2025-09-08 VITALS
WEIGHT: 134 LBS | DIASTOLIC BLOOD PRESSURE: 72 MMHG | BODY MASS INDEX: 23.74 KG/M2 | HEIGHT: 63 IN | SYSTOLIC BLOOD PRESSURE: 120 MMHG

## 2025-09-08 DIAGNOSIS — N91.2 AMENORRHEA, UNSPECIFIED: ICD-10-CM

## 2025-09-08 PROCEDURE — 36415 COLL VENOUS BLD VENIPUNCTURE: CPT

## 2025-09-08 PROCEDURE — 76817 TRANSVAGINAL US OBSTETRIC: CPT

## 2025-09-08 PROCEDURE — 99213 OFFICE O/P EST LOW 20 MIN: CPT | Mod: 25

## 2025-09-08 RX ORDER — DOXYLAMINE SUCCINATE AND PYRIDOXINE HYDROCHLORIDE 10; 10 MG/1; MG/1
10-10 TABLET, DELAYED RELEASE ORAL
Qty: 30 | Refills: 2 | Status: ACTIVE | COMMUNITY
Start: 2025-09-08 | End: 1900-01-01

## 2025-09-10 DIAGNOSIS — E03.9 HYPOTHYROIDISM, UNSPECIFIED: ICD-10-CM

## 2025-09-10 LAB
ABORH: NORMAL
ANTIBODY SCREEN: NORMAL
BASOPHILS # BLD AUTO: 0.09 K/UL
BASOPHILS NFR BLD AUTO: 0.6 %
C TRACH RRNA SPEC QL NAA+PROBE: NOT DETECTED
EOSINOPHIL # BLD AUTO: 0.07 K/UL
EOSINOPHIL NFR BLD AUTO: 0.5 %
HBV SURFACE AG SER QL: NONREACTIVE
HCT VFR BLD CALC: 41.9 %
HCV AB SER QL: NONREACTIVE
HCV S/CO RATIO: 0.07 S/CO
HGB BLD-MCNC: 14.2 G/DL
HIV1+2 AB SPEC QL IA.RAPID: NONREACTIVE
IMM GRANULOCYTES NFR BLD AUTO: 0.3 %
LYMPHOCYTES # BLD AUTO: 4.24 K/UL
LYMPHOCYTES NFR BLD AUTO: 27.9 %
MAN DIFF?: NORMAL
MCHC RBC-ENTMCNC: 30.3 PG
MCHC RBC-ENTMCNC: 33.9 G/DL
MCV RBC AUTO: 89.3 FL
MEV IGG FLD QL IA: >300 AU/ML
MEV IGG+IGM SER-IMP: POSITIVE
MONOCYTES # BLD AUTO: 1.11 K/UL
MONOCYTES NFR BLD AUTO: 7.3 %
N GONORRHOEA RRNA SPEC QL NAA+PROBE: NOT DETECTED
NEUTROPHILS # BLD AUTO: 9.67 K/UL
NEUTROPHILS NFR BLD AUTO: 63.4 %
PLATELET # BLD AUTO: 330 K/UL
RBC # BLD: 4.69 M/UL
RBC # FLD: 11.9 %
RUBV IGG FLD-ACNC: 27.6 INDEX
RUBV IGG SER-IMP: POSITIVE
SOURCE AMPLIFICATION: NORMAL
T PALLIDUM AB SER QL IA: NEGATIVE
TSH SERPL-ACNC: 3.53 UIU/ML
VZV AB TITR SER: NEGATIVE
VZV IGG SER IF-ACNC: 0.33 S/CO
WBC # FLD AUTO: 15.22 K/UL

## 2025-09-11 LAB — THYROPEROXIDASE AB SERPL IA-ACNC: 12.3 IU/ML

## 2025-09-18 LAB
B19V IGG SER QL IA: 6.05 INDEX
B19V IGG+IGM SER-IMP: NORMAL
B19V IGG+IGM SER-IMP: POSITIVE
B19V IGM FLD-ACNC: 0.16 INDEX
B19V IGM SER-ACNC: NEGATIVE